# Patient Record
Sex: FEMALE | Race: WHITE
[De-identification: names, ages, dates, MRNs, and addresses within clinical notes are randomized per-mention and may not be internally consistent; named-entity substitution may affect disease eponyms.]

---

## 2022-05-20 ENCOUNTER — HOSPITAL ENCOUNTER (EMERGENCY)
Dept: HOSPITAL 97 - ER | Age: 22
Discharge: HOME | End: 2022-05-20
Payer: COMMERCIAL

## 2022-05-20 VITALS — SYSTOLIC BLOOD PRESSURE: 115 MMHG | DIASTOLIC BLOOD PRESSURE: 83 MMHG | OXYGEN SATURATION: 100 %

## 2022-05-20 VITALS — TEMPERATURE: 98.7 F

## 2022-05-20 DIAGNOSIS — O26.899: Primary | ICD-10-CM

## 2022-05-20 DIAGNOSIS — Z3A.00: ICD-10-CM

## 2022-05-20 LAB
ALBUMIN SERPL BCP-MCNC: 3.4 G/DL (ref 3.4–5)
ALP SERPL-CCNC: 43 U/L (ref 45–117)
ALT SERPL W P-5'-P-CCNC: 24 U/L (ref 12–78)
AMYLASE SERPL-CCNC: 50 U/L (ref 25–115)
AST SERPL W P-5'-P-CCNC: 14 U/L (ref 15–37)
BUN BLD-MCNC: 11 MG/DL (ref 7–18)
GLUCOSE SERPLBLD-MCNC: 99 MG/DL (ref 74–106)
HCT VFR BLD CALC: 37.7 % (ref 36–45)
LIPASE SERPL-CCNC: 106 U/L (ref 73–393)
LYMPHOCYTES # SPEC AUTO: 1.6 K/UL (ref 0.7–4.9)
MAGNESIUM SERPL-MCNC: 1.8 MG/DL (ref 1.8–2.4)
METHAMPHET UR QL SCN: NEGATIVE
PMV BLD: 8.9 FL (ref 7.6–11.3)
POTASSIUM SERPL-SCNC: 3.4 MMOL/L (ref 3.5–5.1)
RBC # BLD: 4.27 M/UL (ref 3.86–4.86)
SP GR UR: 1.01 (ref 1–1.03)
THC SERPL-MCNC: NEGATIVE NG/ML

## 2022-05-20 PROCEDURE — 84702 CHORIONIC GONADOTROPIN TEST: CPT

## 2022-05-20 PROCEDURE — 80048 BASIC METABOLIC PNL TOTAL CA: CPT

## 2022-05-20 PROCEDURE — 85025 COMPLETE CBC W/AUTO DIFF WBC: CPT

## 2022-05-20 PROCEDURE — 81025 URINE PREGNANCY TEST: CPT

## 2022-05-20 PROCEDURE — 82150 ASSAY OF AMYLASE: CPT

## 2022-05-20 PROCEDURE — 99283 EMERGENCY DEPT VISIT LOW MDM: CPT

## 2022-05-20 PROCEDURE — 81003 URINALYSIS AUTO W/O SCOPE: CPT

## 2022-05-20 PROCEDURE — 83735 ASSAY OF MAGNESIUM: CPT

## 2022-05-20 PROCEDURE — 93005 ELECTROCARDIOGRAM TRACING: CPT

## 2022-05-20 PROCEDURE — 80076 HEPATIC FUNCTION PANEL: CPT

## 2022-05-20 PROCEDURE — 80307 DRUG TEST PRSMV CHEM ANLYZR: CPT

## 2022-05-20 PROCEDURE — 36415 COLL VENOUS BLD VENIPUNCTURE: CPT

## 2022-05-20 PROCEDURE — 83690 ASSAY OF LIPASE: CPT

## 2022-05-20 NOTE — EDPHYS
Physician Documentation                                                                           

 [facility]                                                                 

Name: Mariella Stallworth                                                                               

Age: 22 yrs                                                                                       

Sex: Female                                                                                       

: 2000                                                                                   

MRN: [id number]                                                                                   

Arrival Date: 2022                                                                          

Time: 07:17                                                                                       

Account#: [id number]                                                                            

Bed 18                                                                                            

Private MD:                                                                                       

ED Physician Aaliyah Guerra                                                                    

HPI:                                                                                              

                                                                                             

09:37 This 22 yrs old Female presents to ER via EMS with complaints of Lightheadedness.       ma2 

09:37 This is a healthy 22-year-old female pregnant who had an episode of lightheadedness and ma2 

      near syncope happened earlier today she denies any symptoms specifically no chest pain      

      neck no bladder symptoms no abdominal pain vomiting diarrhea, no other symptoms time.       

      Patient states all symptoms are solved and she is back to normal at this time.              

                                                                                                  

OB/GYN:                                                                                           

07:19 LMP 2022                                                                              ha  

                                                                                                  

Historical:                                                                                       

- Allergies:                                                                                      

07:19 No Known Allergies;                                                                     ha  

                                                                                                  

- Immunization history:: Adult Immunizations.                                                     

- Social history:: Smoking status: Patient denies any tobacco usage or history of.                

- Family history:: not pertinent.                                                                 

                                                                                                  

                                                                                                  

ROS:                                                                                              

09:37 Constitutional: Negative for fever, chills, and weight loss.                            ma2 

09:37 All other systems are negative.                                                             

                                                                                                  

Exam:                                                                                             

09:37 Constitutional:  This is a well developed, well nourished patient who is awake, alert,  ma2 

      and in no acute distress. Head/Face:  Normocephalic, atraumatic. Eyes:  Pupils equal        

      round and reactive to light, extra-ocular motions intact.  Lids and lashes normal.          

      Conjunctiva and sclera are non-icteric and not injected.  Cornea within normal limits.      

      Periorbital areas with no swelling, redness, or edema. ENT:  Nares patent. No nasal         

      discharge, no septal abnormalities noted.  Tympanic membranes are normal and external       

      auditory canals are clear.  Oropharynx with no redness, swelling, or masses, exudates,      

      or evidence of obstruction, uvula midline.  Mucous membranes moist. Neck:  Trachea          

      midline, no thyromegaly or masses palpated, and no cervical lymphadenopathy.  Supple,       

      full range of motion without nuchal rigidity, or vertebral point tenderness.  No            

      Meningismus. Chest/axilla:  Normal chest wall appearance and motion.  Nontender with no     

      deformity.  No lesions are appreciated. Cardiovascular:  Regular rate and rhythm with a     

      normal S1 and S2.  No gallops, murmurs, or rubs.  Normal PMI, no JVD.  No pulse             

      deficits. Respiratory:  Lungs have equal breath sounds bilaterally, clear to                

      auscultation and percussion.  No rales, rhonchi or wheezes noted.  No increased work of     

      breathing, no retractions or nasal flaring. Abdomen/GI:  Soft, non-tender, with normal      

      bowel sounds.  No distension or tympany.  No guarding or rebound.  No evidence of           

      tenderness throughout. Skin:  Warm, dry with normal turgor.  Normal color with no           

      rashes, no lesions, and no evidence of cellulitis. MS/ Extremity:  Pulses equal, no         

      cyanosis.  Neurovascular intact.  Full, normal range of motion. Neuro:  Awake and           

      alert, GCS 15, oriented to person, place, time, and situation.  Cranial nerves II-XII       

      grossly intact.  Motor strength 5/5 in all extremities.  Sensory grossly intact.            

      Cerebellar exam normal.  Normal gait.                                                       

                                                                                                  

Vital Signs:                                                                                      

07:17  / 84; Pulse 86; Resp 18; Temp 98.7; Pulse Ox 99% on R/A; Weight 77.11 kg; Height plata  

      5 ft. 3 in. (160.02 cm);                                                                    

09:24  / 83; Pulse 75; Resp 18; Pulse Ox 100% on R/A;                                   ha  

07:17 Body Mass Index 30.11 (77.11 kg, 160.02 cm)                                             ha  

                                                                                                  

MDM:                                                                                              

07:19 Patient medically screened.                                                             ma2 

09:37 Differential diagnosis: hypovolemia, near-syncope, pregnancy. Data reviewed: vital      [facility] 

      signs, nurses notes. Counseling: I had a detailed discussion with the patient and/or        

      guardian regarding: the historical points, exam findings, and any diagnostic results        

      supporting the discharge/admit diagnosis, lab results, the need for outpatient follow       

      up. Response to treatment: the patient's symptoms have resolved after treatment.            

                                                                                                  

                                                                                             

07:19 Order name: Amylase, Serum; Complete Time: :                                                                                             

07:19 Order name: Basic Metabolic Panel; Complete Time: :                                                                                             

07:19 Order name: CBC with Diff; Complete Time: :                                                                                             

07:19 Order name: Hepatic Function; Complete Time: :                                                                                             

07:19 Order name: Lipase; Complete Time: 09:                                                                                             

07:19 Order name: Magnesium; Complete Time: 09:01                                             [facility] 

                                                                                             

07:19 Order name: UDS; Complete Time: 09:01                                                   [facility] 

                                                                                             

07:19 Order name: EKG; Complete Time: 07:20                                                   [facility] 

                                                                                             

07:19 Order name: Accucheck; Complete Time: 09:25                                             [facility] 

                                                                                             

07:19 Order name: Cardiac monitoring; Complete Time: 07:38                                    [facility] 

                                                                                             

07:20 Order name: HCG-Quantitative; Complete Time: 09:01                                      [facility] 

                                                                                             

07:38 Order name: Urine Dipstick-Ancillary; Complete Time: 09:01                              [facility]

                                                                                             

08:26 Order name: Urine Pregnancy--Ancillary (enter results); Complete Time: 09:36            3 

                                                                                             

07:19 Order name: EKG - Nurse/Tech; Complete Time: 08:26                                      [facility] 

                                                                                             

07:19 Order name: IV Saline Lock; Complete Time: 07:37                                        [facility] 

                                                                                             

07:19 Order name: Labs collected and sent; Complete Time: 07:37                               [facility] 

                                                                                             

07:19 Order name: O2 Per Protocol; Complete Time: 07:37                                       [facility] 

                                                                                             

07:19 Order name: O2 Sat Monitoring; Complete Time: 07:37                                     [facility] 

                                                                                             

07:20 Order name: Urine Dipstick-Ancillary (obtain specimen); Complete Time: 07:37            [facility] 

                                                                                                  

Administered Medications:                                                                         

08:26 Drug: NS 0.9% 1000 ml Route: IV; Rate: 1 bolus; Site: right antecubital;                ha  

                                                                                                  

                                                                                                  

Disposition Summary:                                                                              

22 09:39                                                                                    

Discharge Ordered                                                                                 

      Location: Home                                                                          ma2 

      Condition: Stable                                                                       ma2 

      Diagnosis                                                                                   

        - Pregnancy related conditions, unspecified                                           ma2 

      Followup:                                                                               ma2 

        - With: Private Physician                                                                  

        - When: Tomorrow                                                                           

        - Reason: If symptoms return                                                               

      Discharge Instructions:                                                                     

        - Discharge Summary Sheet                                                             ma2 

        - Pregnancy and Urinary Tract Infection                                               ma2 

      Forms:                                                                                      

        - Medication Reconciliation Form                                                      ma2 

        - Thank You Letter                                                                    ma2 

        - Antibiotic Education                                                                ma2 

        - Prescription Opioid Use                                                             ma2 

        - Work release form                                                                   ha  

      Prescriptions:                                                                              

        - Amoxicillin 875 mg Oral Tablet                                                           

            - take 1 tablet by ORAL route every 12 hours for 10 days; 20 tablet; Refills: 0,  ma2 

      Product Selection Permitted                                                                 

Signatures:                                                                                       

Dispatcher [employer]                           EDMS                                                 

Alzahri, MohMD MD kinza nathanrMer RN                  RN   plata                                                   

                                                                                                  

Corrections: (The following items were deleted from the chart)                                    

07:20 07:19 Stroke Swallow Screen ordered. kinza echols 

                                                                                                  

**************************************************************************************************

## 2022-05-20 NOTE — ER
Nurse's Notes                                                                                     

 [facility]                                                                 

Name: Mariella Stallworth                                                                               

Age: 22 yrs                                                                                       

Sex: Female                                                                                       

: 2000                                                                                   

MRN: [id number]                                                                                   

Arrival Date: 2022                                                                          

Time: 07:17                                                                                       

Account#: [id number]                                                                            

Bed 18                                                                                            

Private MD:                                                                                       

Diagnosis: Pregnancy related conditions, unspecified                                              

                                                                                                  

Presentation:                                                                                     

                                                                                             

07:17 Chief complaint: Patient states: Alter mental status. right side numbness that          ha  

      resolves, blurry vision that resolved. Coronavirus screen: Vaccine status: Patient          

      reports being unvaccinated. Ebola Screen: Patient denies exposure to infectious person.     

      Patient denies travel to an Ebola-affected area in the 21 days before illness onset.        

      Initial Sepsis Screen: Does the patient meet any 2 criteria? No. Patient's initial          

      sepsis screen is negative. Does the patient have a suspected source of infection? No.       

      Patient's initial sepsis screen is negative. Risk Assessment: Do you want to hurt           

      yourself or someone else? Patient reports no desire to harm self or others. Onset of        

      symptoms was May 20, 2022.                                                                  

07:17 Method Of Arrival: EMS: [facility]                                                         ha  

07:17 Acuity: JUNIE 3                                                                           ha  

                                                                                                  

Triage Assessment:                                                                                

07:19 General: Appears in no apparent distress. Behavior is calm, cooperative. Pain: Denies   ha  

      pain. Neuro: Level of Consciousness is awake, alert, obeys commands, Oriented to            

      person, place, time, situation,  are equal bilaterally Moves all extremities.          

      Speech is normal, Reports numbness in right side of body.                                   

                                                                                                  

OB/GYN:                                                                                           

07:19 [facility] 2022                                                                              ha  

                                                                                                  

Historical:                                                                                       

- Allergies:                                                                                      

07:19 No Known Allergies;                                                                     ha  

                                                                                                  

- Immunization history:: Adult Immunizations.                                                     

- Social history:: Smoking status: Patient denies any tobacco usage or history of.                

- Family history:: not pertinent.                                                                 

                                                                                                  

                                                                                                  

Screenin:21 Abuse screen: Denies threats or abuse. Denies injuries from another. Nutritional        ha  

      screening: No deficits noted. Tuberculosis screening: No symptoms or risk factors           

      identified. Fall Risk None identified.                                                      

                                                                                                  

Vital Signs:                                                                                      

07:17  / 84; Pulse 86; Resp 18; Temp 98.7; Pulse Ox 99% on R/A; Weight 77.11 kg; Height plata  

      5 ft. 3 in. (160.02 cm);                                                                    

09:24  / 83; Pulse 75; Resp 18; Pulse Ox 100% on R/A;                                   ha  

07:17 Body Mass Index 30.11 (77.11 kg, 160.02 cm)                                             ha  

                                                                                                  

ED Course:                                                                                        

07:17 Patient arrived in ED.                                                                  plata  

07:18 Aaliyah Guerra MD is Attending Physician.                                           ma 

07:19 Triage completed.                                                                       ha  

07:19 Arm band placed on.                                                                     ha  

07:21 Patient has correct armband on for positive identification. Bed in low position.        ha  

07:21 No provider procedures requiring assistance completed.                                  ha  

07:22 Mer Duque, RN is Primary Nurse.                                                ha  

10:34 IV discontinued, intact, Pressure dressing applied.                                     ha  

                                                                                                  

Administered Medications:                                                                         

08:26 Drug: NS 0.9% 1000 ml Route: IV; Rate: 1 bolus; Site: right antecubital;                ha  

                                                                                                  

                                                                                                  

Medication:                                                                                       

07:22 VIS not applicable for this client.                                                     ha  

                                                                                                  

Outcome:                                                                                          

09:39 Discharge ordered by MD.                                                                ma2 

10:34 Discharged to home ambulatory.                                                          ha  

10:34 Condition: good                                                                             

10:34 Discharge instructions given to patient, Prescriptions given X 1.                           

10:34 Patient left the ED.                                                                    ha  

                                                                                                  

Signatures:                                                                                       

Aaliyah Guerra MD MD   [facility]                                                  

Mer Duque, RN                  RN   plata                                                   

                                                                                                  

**************************************************************************************************

## 2022-05-21 NOTE — EKG
Test Date:    2022-05-20               Test Time:    08:17:14

Technician:   MAGDA                                   

                                                     

MEASUREMENT RESULTS:                                       

Intervals:                                           

Rate:         65                                     

SC:           162                                    

QRSD:         76                                     

QT:           392                                    

QTc:          407                                    

Axis:                                                

P:            38                                     

SC:           162                                    

QRS:          57                                     

T:            24                                     

                                                     

INTERPRETIVE STATEMENTS:                                       

                                                     

Normal sinus rhythm

Normal ECG

No previous ECG available for comparison



Electronically Signed On 05-21-22 11:06:48 CDT by Kal Lizama